# Patient Record
Sex: MALE | Race: WHITE
[De-identification: names, ages, dates, MRNs, and addresses within clinical notes are randomized per-mention and may not be internally consistent; named-entity substitution may affect disease eponyms.]

---

## 2019-12-21 ENCOUNTER — HOSPITAL ENCOUNTER (EMERGENCY)
Dept: HOSPITAL 74 - JER | Age: 39
Discharge: HOME | End: 2019-12-21
Payer: SELF-PAY

## 2019-12-21 VITALS — DIASTOLIC BLOOD PRESSURE: 96 MMHG | SYSTOLIC BLOOD PRESSURE: 134 MMHG | HEART RATE: 68 BPM | TEMPERATURE: 97.2 F

## 2019-12-21 VITALS — BODY MASS INDEX: 28.9 KG/M2

## 2019-12-21 LAB
ALBUMIN SERPL-MCNC: 4 G/DL (ref 3.4–5)
ALP SERPL-CCNC: 127 U/L (ref 45–117)
ALT SERPL-CCNC: 57 U/L (ref 13–61)
ANION GAP SERPL CALC-SCNC: 7 MMOL/L (ref 8–16)
APPEARANCE UR: CLEAR
AST SERPL-CCNC: 42 U/L (ref 15–37)
BASOPHILS # BLD: 0.7 % (ref 0–2)
BILIRUB SERPL-MCNC: 0.5 MG/DL (ref 0.2–1)
BILIRUB UR STRIP.AUTO-MCNC: NEGATIVE MG/DL
BUN SERPL-MCNC: 16.6 MG/DL (ref 7–18)
CALCIUM SERPL-MCNC: 9.5 MG/DL (ref 8.5–10.1)
CHLORIDE SERPL-SCNC: 106 MMOL/L (ref 98–107)
CO2 SERPL-SCNC: 26 MMOL/L (ref 21–32)
COLOR UR: YELLOW
CREAT SERPL-MCNC: 0.8 MG/DL (ref 0.55–1.3)
DEPRECATED RDW RBC AUTO: 13.3 % (ref 11.9–15.9)
EOSINOPHIL # BLD: 3.8 % (ref 0–4.5)
GLUCOSE SERPL-MCNC: 103 MG/DL (ref 74–106)
HCT VFR BLD CALC: 45.4 % (ref 35.4–49)
HGB BLD-MCNC: 15.5 GM/DL (ref 11.7–16.9)
KETONES UR QL STRIP: NEGATIVE
LEUKOCYTE ESTERASE UR QL STRIP.AUTO: NEGATIVE
LIPASE SERPL-CCNC: 149 U/L (ref 73–393)
LYMPHOCYTES # BLD: 20.5 % (ref 8–40)
MCH RBC QN AUTO: 32.5 PG (ref 25.7–33.7)
MCHC RBC AUTO-ENTMCNC: 34 G/DL (ref 32–35.9)
MCV RBC: 95.5 FL (ref 80–96)
MONOCYTES # BLD AUTO: 7 % (ref 3.8–10.2)
NEUTROPHILS # BLD: 68 % (ref 42.8–82.8)
NITRITE UR QL STRIP: NEGATIVE
PH UR: 5.5 [PH] (ref 5–8)
PLATELET # BLD AUTO: 278 K/MM3 (ref 134–434)
PMV BLD: 9 FL (ref 7.5–11.1)
POTASSIUM SERPLBLD-SCNC: 4.6 MMOL/L (ref 3.5–5.1)
PROT SERPL-MCNC: 7.8 G/DL (ref 6.4–8.2)
PROT UR QL STRIP: NEGATIVE
PROT UR QL STRIP: NEGATIVE
RBC # BLD AUTO: 4.76 M/MM3 (ref 4–5.6)
SODIUM SERPL-SCNC: 139 MMOL/L (ref 136–145)
SP GR UR: 1.02 (ref 1.01–1.03)
UROBILINOGEN UR STRIP-MCNC: 0.2 MG/DL (ref 0.2–1)
WBC # BLD AUTO: 5 K/MM3 (ref 4–10)

## 2019-12-21 NOTE — PDOC
Documentation entered by Mayra Beauchamp SCRIBE, acting as scribe for Thais Dubon MD.








Thais Dubon MD:  This documentation has been prepared by the Quynh cook Adrianna, SCRIBE, under my direction and personally reviewed by me in its 

entirety.  I confirm that the documentation accurately reflects all work, 

treatment, procedures, and medical decision making performed by me.  





Attending Attestation





- Resident


Resident Name: Isidro Mitchell





- ED Attending Attestation


I have performed the following: I have examined & evaluated the patient, The 

case was reviewed & discussed with the resident, I agree w/resident's findings 

& plan, Exceptions are as noted





- HPI


HPI: 


The patient is a 39 year old male, with no significant PMH, who presents to the 

ED for evaluation of flank pain for 1 week. Patient complains of gradual onset 

left-sided flank pain, that radiates to the left side of the back, chest, and 

abdomen. Denies any trauma to the area.  He denies heavy lifting


He denies fevers or chills


He denies hematuria, dysuria


He denies rash


He denies chest pain or difficulty breathing


He has had these symptoms in the past, was seen by a doctor who gave him 

antibiotics for a "cold" and his symptoms resolved


He denies nausea, vomiting, diarrhea





Allergies: NKA, NKDA


Surgical History: 


Social History: Daily EtOH use (2-3 beers). 


12/21/19 13:09








- Physicial Exam


PE: 


GENERAL: The patient is in no acute distress.


ENT: Ears normal, nares patent, oropharynx clear without exudates.  Moist 

mucous membranes.


NECK: Normal range of motion, supple, no nuchal rigidity  


LUNGS: Breath sounds equal, clear to auscultation bilaterally.  No wheezes, and 

no crackles.


HEART: Regular rate and rhythm, normal S1 and S2 without murmur, rub or gallop.


ABDOMEN: Soft, LUQ tenderness, normoactive bowel sounds. No guarding, no 

rebound.  


MUSCULOSKELETAL: chest wall tenderness, no involuntary guarding 


EXTREMITIES: Normal range of motion, no edema.   


NEUROLOGICAL: Cranial nerves II through XII grossly intact.  Normal speech. No 

focal neurological deficits.


SKIN: Warm, Dry, normal turgor, no rashes or lesions noted.





12/21/19 13:10








- Medical Decision Making





12/21/19 13:11


Left Flank pain


Mild LUQ abdominal tenderness to palpation 





DD: ureteral obstructing stone, pyelonephritis, GERD, Ulcer, 





Will do:


Labs


UA


Spiral CT


 Laboratory Tests











  12/21/19 12/21/19 12/21/19





  09:21 09:25 09:25


 


WBC   5.0 


 


Hgb   15.5 


 


Hct   45.4 


 


Plt Count   278 


 


BUN    16.6


 


Creatinine    0.8


 


Urine Blood  Negative  


 


Urine Nitrite  Negative  


 


Ur Leukocyte Esterase  Negative  











12/21/19 13:14


Awaiting CT head

## 2019-12-21 NOTE — PDOC
History of Present Illness





- General


Chief Complaint: Pain, Acute


Stated Complaint: ABD. PAIN


Time Seen by Provider: 12/21/19 08:48


History Source: Patient


Exam Limitations: No Limitations





- History of Present Illness


Initial Comments: 





12/21/19 09:04


39yM w PMHx HTN presenting w L flank pain. Insidious onset 1 week ago. Now 

constant pain radiating to L back, L chest, umbilical region. Did not seek 

medical attention until now d/t work. Denies trauma, AB surgery, did not take 

any pain meds. Drinks 2-3 beers/day. Was seen in 2016 for R flank pain, workup 

negative, pain relieved w ibuprofen and percocet. Denies fever, chest pain, 

nausea/vomiting, SOB, BLE neuro deficits, urinary/bowel mvmt issues. 





Past History





- Past Medical History


Allergies/Adverse Reactions: 


 Allergies











Allergy/AdvReac Type Severity Reaction Status Date / Time


 


No Known Allergies Allergy   Unverified 12/21/19 08:11











Home Medications: 


Ambulatory Orders





Ibuprofen 800 mg PO QID PRN #20 tablet 12/01/16 


Oxycodone HCl/Acetaminophen [Percocet 5-325 mg Tablet] 1 tab PO Q4H PRN #10 

tablet MDD 6 12/01/16 











- Immunization History


Td Vaccination: Yes


Immunization Up to Date: Yes





- Psycho Social/Smoking Cessation Hx


Smoking Status: No


Smoking History: Never smoked


Number of Cigarettes Smoked Daily: 0


Cigars Per Day: 0


Hx Alcohol Use: No


Drug/Substance Use Hx: No


Substance Use Type: None





**Review of Systems





- Review of Systems


Constitutional: No: Chills, Fever


HEENTM: No: Eye Pain, Nose Pain, Throat Pain, Mouth Pain


Respiratory: No: Cough, Shortness of Breath, Wheezing


Cardiac (ROS): Yes: Chest Pain (L).  No: Palpitations, Syncope


ABD/GI: No: Abdominal Distended, Constipated, Diarrhea, Nausea, Vomiting


: Yes: Flank Pain (L).  No: Burning, Dysuria, Discharge, Hematuria


Musculoskeletal: Yes: Back Pain (L)


Integumentary: No: Bruising, Flushing, Lesions


Neurological: No: Headache, Seizure, Tingling


Psychiatric: No: Anxiety, Depression


Endocrine: No: Excessive Sweating, Flushing, Intolerance to Cold, Intolerance 

to Heat


Hematologic/Lymphatic: No: Anemia, Blood Clots





*Physical Exam





- Vital Signs


 Last Vital Signs











Temp Pulse Resp BP Pulse Ox


 


 97.2 F L  68   18   134/96   99 


 


 12/21/19 08:11  12/21/19 08:11  12/21/19 08:11  12/21/19 08:11  12/21/19 08:11














- Physical Exam


General Appearance: Yes: Nourished, Appropriately Dressed, Mild Distress


HEENT: positive: EOMI, DONNA, Normal Voice, Hearing Grossly Normal.  negative: 

Scleral Icterus (R), Scleral Icterus (L), Nasal Congestion, Rhinorrhea


Respiratory/Chest: positive: Lungs Clear, Normal Breath Sounds.  negative: 

Chest Tender, Respiratory Distress, Crackles, Rales, Rhonchi, Stridor, Wheezing


Cardiovascular: positive: Regular Rhythm, Regular Rate, S1, S2.  negative: Edema

, Murmur


Gastrointestinal/Abdominal: positive: Normal Bowel Sounds, Tender (moderate L 

flank tender), Flat, Soft.  negative: Organomegaly, Distended, Guarding, Rebound

, Tenderness, Hernia, Mass


Musculoskeletal: negative: CVA Tenderness (R), CVA Tenderness (L)


Extremity: positive: Normal Capillary Refill


Integumentary: positive: Normal Color


Neurologic: positive: Fully Oriented, Alert, Normal Mood/Affect, Normal Response

, Responsive.  negative: Sensory Deficit, Confused, Disoriented





ED Treatment Course





- LABORATORY


CBC & Chemistry Diagram: 


 12/21/19 09:25





 12/21/19 09:25





Medical Decision Making





- Medical Decision Making





12/21/19 09:40


CBC CMP lipase normal


tylenol





---





39yM w PMHx HTN presenting w 1 week L flank pain likely d/t MSK strain from 

work. Spiral CT did not show renal stone. CT showed fatty liver. 


Low concern for pancreatitis (normal lipase) vs UTI (clean UA). Given tylenol w 

pain relief. DC home w PCP f/u








Discharge





- Discharge Information


Problems reviewed: Yes


Clinical Impression/Diagnosis: 


 Left flank pain





Condition: Improved


Disposition: HOME





- Admission


No





- Follow up/Referral





- Patient Discharge Instructions


Patient Printed Discharge Instructions:  DI for Flank Pain


Additional Instructions: 


you were seen for flank pain. Your labs and imaging did not show anything 

concerning. You were given pain medication





Please follow up with your primary care doctor regarding this visit





You can take tylenol or ibuprofen if you continue to have pain





Come back to the ED if you see blood in your urine, worsening pain despite 

medication, or vomit.





- Post Discharge Activity